# Patient Record
Sex: FEMALE | Race: BLACK OR AFRICAN AMERICAN | HISPANIC OR LATINO | Employment: OTHER | URBAN - METROPOLITAN AREA
[De-identification: names, ages, dates, MRNs, and addresses within clinical notes are randomized per-mention and may not be internally consistent; named-entity substitution may affect disease eponyms.]

---

## 2020-01-12 ENCOUNTER — OFFICE VISIT (OUTPATIENT)
Dept: URGENT CARE | Age: 60
End: 2020-01-12

## 2020-01-12 VITALS
HEART RATE: 60 BPM | OXYGEN SATURATION: 99 % | SYSTOLIC BLOOD PRESSURE: 53 MMHG | DIASTOLIC BLOOD PRESSURE: 27 MMHG | RESPIRATION RATE: 8 BRPM

## 2020-01-12 DIAGNOSIS — S09.90XA TRAUMATIC INJURY OF HEAD, INITIAL ENCOUNTER: Primary | ICD-10-CM

## 2020-01-12 NOTE — PROGRESS NOTES
[facility]        NAME: Debi Benavides is a 61 y o  female  : 1960    MRN: [id number]  DATE: 2020  TIME: 3:53 PM    Assessment and Plan   Traumatic injury of head, initial encounter [S09 90XA]  1  Traumatic injury of head, initial encounter  Transfer to other facility     Patient Instructions     Patient to [place] [address] ER via ambulance    Chief Complaint     Chief Complaint   Patient presents with    Head Injury     History of Present Illness     63yo F presents 1 hour after fall down unknown number of steps in her home  Per patient's daughter, patient was walking down steps when she fell down the steps  Daughter rushed to help the patient, and found her awake but disoriented at the bottom of the steps  Patient has laceration to right eyebrow with significant bruising surrounding right orbit  Patient's daughter states she had great difficulty getting patient come to  to be evaluated, but when patient consented she began to fill sickly on car ride to   Patient became clammy, pale, and lightheaded  When she arrived in , she lost consciousness at registration window  Pt was unconscious for approx 30s  Pt woke up when I sternal rubbed her  She remained verbally unresponsive for approx 5 minutes  Limited history obtained due to patients syncopal episode  Review of Systems   Review of Systems   Constitutional: Positive for activity change and diaphoresis  Negative for appetite change, fatigue and fever  Respiratory: Negative for apnea  Cardiovascular: Negative for chest pain and palpitations  Skin: Positive for wound  Neurological: Positive for syncope, speech difficulty and headaches  Negative for dizziness, tremors, seizures, facial asymmetry, weakness and numbness  Current Medications     No current outpatient medications on file      Current Allergies     Allergies as of 2020    (Not on File)          The following portions of the patient's history were reviewed and updated as appropriate: allergies, current medications, past family history, past medical history, past social history, past surgical history and problem list      No past medical history on file  No past surgical history on file  No family history on file  Medications have been verified  Objective   BP (!) 53/27 Comment: automatic dynamap  Pulse 60   Resp (!) 8   SpO2 99%   BS: 86     Physical Exam     Physical Exam   Constitutional: She appears well-developed and well-nourished  Cardiovascular: Regular rhythm and normal heart sounds  Bradycardia present  Exam reveals no gallop and no friction rub  No murmur heard  Pulmonary/Chest: Effort normal and breath sounds normal  No stridor  Bradypnea noted  She has no decreased breath sounds  She has no wheezes  She has no rhonchi  She has no rales  Abdominal: Soft  Bowel sounds are normal  She exhibits no distension  There is no tenderness  Neurological: She is disoriented  She displays normal reflexes  No cranial nerve deficit or sensory deficit  GCS eye subscore is 4  GCS verbal subscore is 5  GCS motor subscore is 5     Skin:

## 2020-01-19 ENCOUNTER — OFFICE VISIT (OUTPATIENT)
Dept: URGENT CARE | Age: 60
End: 2020-01-19
Payer: COMMERCIAL

## 2020-01-19 VITALS
HEART RATE: 77 BPM | BODY MASS INDEX: 35.73 KG/M2 | HEIGHT: 60 IN | DIASTOLIC BLOOD PRESSURE: 56 MMHG | WEIGHT: 182 LBS | SYSTOLIC BLOOD PRESSURE: 117 MMHG | TEMPERATURE: 97.3 F | RESPIRATION RATE: 16 BRPM | OXYGEN SATURATION: 96 %

## 2020-01-19 DIAGNOSIS — Z48.02 ENCOUNTER FOR REMOVAL OF SUTURES: Primary | ICD-10-CM

## 2020-01-19 PROCEDURE — 99211 OFF/OP EST MAY X REQ PHY/QHP: CPT | Performed by: PHYSICIAN ASSISTANT

## 2020-01-20 NOTE — PROGRESS NOTES
[facility]        NAME: Joyce Castrejon is a 61 y o  female  : 1960    MRN: [id number]  DATE: 2020  TIME: 7:41 PM    /56   Pulse 77   Temp (!) 97 3 °F (36 3 °C)   Resp 16   Ht 5' (1 524 m)   Wt 82 6 kg (182 lb)   SpO2 96%   BMI 35 54 kg/m²     Assessment and Plan   No primary diagnosis found  No diagnosis found  Patient Instructions       Follow up with PCP in 3-5 days  Proceed to  ER if symptoms worsen  Chief Complaint     Chief Complaint   Patient presents with    Suture / Staple Removal     Right eye area         History of Present Illness       Pt for suture removal     Suture / Staple Removal   The sutures were placed 7 to 10 days ago  She tried nothing since the wound repair  The treatment provided no relief  There has been no drainage from the wound  There is no redness present  There is no swelling present  There is no pain present  She has no difficulty moving the affected extremity or digit  Review of Systems   Review of Systems   Constitutional: Negative  HENT: Negative  Eyes: Negative  Respiratory: Negative  Cardiovascular: Negative  Gastrointestinal: Negative  Endocrine: Negative  Genitourinary: Negative  Musculoskeletal: Negative  Skin: Negative  Allergic/Immunologic: Negative  Neurological: Negative  Hematological: Negative  Psychiatric/Behavioral: Negative  All other systems reviewed and are negative  Current Medications     No current outpatient medications on file  Current Allergies     Allergies as of 2020    (No Known Allergies)            The following portions of the patient's history were reviewed and updated as appropriate: allergies, current medications, past family history, past medical history, past social history, past surgical history and problem list      History reviewed  No pertinent past medical history  History reviewed  No pertinent surgical history      History reviewed  No pertinent family history  Medications have been verified  Objective   /56   Pulse 77   Temp (!) 97 3 °F (36 3 °C)   Resp 16   Ht 5' (1 524 m)   Wt 82 6 kg (182 lb)   SpO2 96%   BMI 35 54 kg/m²        Physical Exam     Physical Exam   Constitutional: She is oriented to person, place, and time  She appears well-developed and well-nourished  HENT:   Head: Normocephalic and atraumatic  Right Ear: External ear normal    Left Ear: External ear normal    Nose: Nose normal    Mouth/Throat: Oropharynx is clear and moist    Eyes: Pupils are equal, round, and reactive to light  Conjunctivae and EOM are normal    Neck: Normal range of motion  Neck supple  Cardiovascular: Normal rate, regular rhythm, normal heart sounds and intact distal pulses  Pulmonary/Chest: Effort normal and breath sounds normal    Abdominal: Soft  Bowel sounds are normal    Musculoskeletal: Normal range of motion  Neurological: She is alert and oriented to person, place, and time  Skin: Skin is warm  Capillary refill takes less than 2 seconds  Right eyebrow 2 sutures removed    Psychiatric: She has a normal mood and affect  Her behavior is normal    Nursing note and vitals reviewed

## 2020-01-20 NOTE — PATIENT INSTRUCTIONS
Stitches Removal   WHAT YOU NEED TO KNOW:   Stitches may need to be removed in 3 to 14 days depending on the location of your wound  Your healthcare provider will use sterile forceps or tweezers to  the knot of each stitch  He will cut the stitch with scissors and pull the stitch out  You may feel a slight tug as the stitch comes out  He may place small steristrips across your wound after the stitches have been removed  These pieces of tape will peel and fall of on their own  Do not pull them off  DISCHARGE INSTRUCTIONS:   Return to the emergency department if:   · Your wound splits open  · You suddenly cannot move your injured joint  · You have sudden numbness around your wound  · You see red streaks coming from your wound  Contact your healthcare provider if:   · You have a fever and chills  · Your wound is red, warm, swollen, or leaking pus  · There is a bad smell coming from your wound  · You have increased pain in the wound area  · You have questions or concerns about your condition or care  Care for your wound:   · Clean your wound as directed  Carefully wash your wound with soap and water  Pat the area dry with a clean towel  · Protect your wound  Your wound can swell, bleed, or split open if it is stretched or bumped  You may need to wear a bandage that supports your wound until it is completely healed  · Minimize your scar  Use sunblock if your wound is exposed to the sun  Apply it every day after the stitches are removed  This will help prevent skin discoloration  Follow up with your healthcare provider as directed: You may need to return in 3 to 5 days if the stitches are on your face  Stitches on your scalp need to be removed after 7 to 14 days  Stitches over joints may remain in place up to 14 days  Write down your questions so you remember to ask them during your visits     © 2017 [employer] Information is for End User's use only and may not be sold, redistributed or otherwise used for commercial purposes  All illustrations and images included in CareNotes® are the copyrighted property of [employer]  or Maxi Amador  The above information is an  only  It is not intended as medical advice for individual conditions or treatments  Talk to your doctor, nurse or pharmacist before following any medical regimen to see if it is safe and effective for you

## 2020-05-30 ENCOUNTER — HOSPITAL ENCOUNTER (EMERGENCY)
Facility: HOSPITAL | Age: 60
Discharge: HOME | End: 2020-05-30
Attending: EMERGENCY MEDICINE
Payer: COMMERCIAL

## 2020-05-30 ENCOUNTER — OFFICE VISIT (OUTPATIENT)
Dept: URGENT CARE | Age: 60
End: 2020-05-30
Payer: COMMERCIAL

## 2020-05-30 ENCOUNTER — APPOINTMENT (EMERGENCY)
Dept: RADIOLOGY | Facility: HOSPITAL | Age: 60
End: 2020-05-30
Payer: COMMERCIAL

## 2020-05-30 VITALS
RESPIRATION RATE: 16 BRPM | DIASTOLIC BLOOD PRESSURE: 59 MMHG | OXYGEN SATURATION: 96 % | SYSTOLIC BLOOD PRESSURE: 125 MMHG | TEMPERATURE: 98.9 F | HEART RATE: 64 BPM

## 2020-05-30 VITALS
SYSTOLIC BLOOD PRESSURE: 136 MMHG | DIASTOLIC BLOOD PRESSURE: 64 MMHG | OXYGEN SATURATION: 98 % | RESPIRATION RATE: 18 BRPM | HEART RATE: 77 BPM | TEMPERATURE: 98.2 F

## 2020-05-30 DIAGNOSIS — R07.89 ATYPICAL CHEST PAIN: Primary | ICD-10-CM

## 2020-05-30 DIAGNOSIS — R07.9 CHEST PAIN, UNSPECIFIED TYPE: Primary | ICD-10-CM

## 2020-05-30 LAB
ALBUMIN SERPL BCP-MCNC: 4.1 G/DL (ref 3.5–5)
ALP SERPL-CCNC: 82 U/L (ref 46–116)
ALT SERPL W P-5'-P-CCNC: 20 U/L (ref 12–78)
ANION GAP SERPL CALCULATED.3IONS-SCNC: 6 MMOL/L (ref 4–13)
AST SERPL W P-5'-P-CCNC: 15 U/L (ref 5–45)
ATRIAL RATE: 69 BPM
ATRIAL RATE: 70 BPM
BASOPHILS # BLD AUTO: 0.03 THOUSANDS/ΜL (ref 0–0.1)
BASOPHILS NFR BLD AUTO: 0 % (ref 0–1)
BILIRUB SERPL-MCNC: 0.39 MG/DL (ref 0.2–1)
BUN SERPL-MCNC: 15 MG/DL (ref 5–25)
CALCIUM SERPL-MCNC: 10 MG/DL (ref 8.3–10.1)
CHLORIDE SERPL-SCNC: 102 MMOL/L (ref 100–108)
CO2 SERPL-SCNC: 32 MMOL/L (ref 21–32)
CREAT SERPL-MCNC: 0.74 MG/DL (ref 0.6–1.3)
D DIMER PPP FEU-MCNC: 0.27 UG/ML FEU
EOSINOPHIL # BLD AUTO: 0.06 THOUSAND/ΜL (ref 0–0.61)
EOSINOPHIL NFR BLD AUTO: 1 % (ref 0–6)
ERYTHROCYTE [DISTWIDTH] IN BLOOD BY AUTOMATED COUNT: 15.2 % (ref 11.6–15.1)
GFR SERPL CREATININE-BSD FRML MDRD: 102 ML/MIN/1.73SQ M
GLUCOSE SERPL-MCNC: 128 MG/DL (ref 65–140)
HCT VFR BLD AUTO: 45 % (ref 34.8–46.1)
HGB BLD-MCNC: 14.2 G/DL (ref 11.5–15.4)
IMM GRANULOCYTES # BLD AUTO: 0.03 THOUSAND/UL (ref 0–0.2)
IMM GRANULOCYTES NFR BLD AUTO: 0 % (ref 0–2)
LYMPHOCYTES # BLD AUTO: 1.33 THOUSANDS/ΜL (ref 0.6–4.47)
LYMPHOCYTES NFR BLD AUTO: 14 % (ref 14–44)
MCH RBC QN AUTO: 30.3 PG (ref 26.8–34.3)
MCHC RBC AUTO-ENTMCNC: 31.6 G/DL (ref 31.4–37.4)
MCV RBC AUTO: 96 FL (ref 82–98)
MONOCYTES # BLD AUTO: 0.39 THOUSAND/ΜL (ref 0.17–1.22)
MONOCYTES NFR BLD AUTO: 4 % (ref 4–12)
NEUTROPHILS # BLD AUTO: 7.77 THOUSANDS/ΜL (ref 1.85–7.62)
NEUTS SEG NFR BLD AUTO: 81 % (ref 43–75)
NRBC BLD AUTO-RTO: 0 /100 WBCS
P AXIS: 61 DEGREES
P AXIS: 69 DEGREES
PLATELET # BLD AUTO: 264 THOUSANDS/UL (ref 149–390)
PMV BLD AUTO: 10.3 FL (ref 8.9–12.7)
POTASSIUM SERPL-SCNC: 4.3 MMOL/L (ref 3.5–5.3)
PR INTERVAL: 154 MS
PR INTERVAL: 158 MS
PROT SERPL-MCNC: 7.7 G/DL (ref 6.4–8.2)
QRS AXIS: 32 DEGREES
QRS AXIS: 34 DEGREES
QRSD INTERVAL: 76 MS
QRSD INTERVAL: 78 MS
QT INTERVAL: 386 MS
QT INTERVAL: 390 MS
QTC INTERVAL: 416 MS
QTC INTERVAL: 417 MS
RBC # BLD AUTO: 4.68 MILLION/UL (ref 3.81–5.12)
SODIUM SERPL-SCNC: 140 MMOL/L (ref 136–145)
T WAVE AXIS: 43 DEGREES
T WAVE AXIS: 47 DEGREES
TROPONIN I SERPL-MCNC: <0.02 NG/ML
VENTRICULAR RATE: 69 BPM
VENTRICULAR RATE: 70 BPM
WBC # BLD AUTO: 9.61 THOUSAND/UL (ref 4.31–10.16)

## 2020-05-30 PROCEDURE — 36415 COLL VENOUS BLD VENIPUNCTURE: CPT | Performed by: EMERGENCY MEDICINE

## 2020-05-30 PROCEDURE — 96374 THER/PROPH/DIAG INJ IV PUSH: CPT

## 2020-05-30 PROCEDURE — 99284 EMERGENCY DEPT VISIT MOD MDM: CPT | Performed by: EMERGENCY MEDICINE

## 2020-05-30 PROCEDURE — 84484 ASSAY OF TROPONIN QUANT: CPT | Performed by: EMERGENCY MEDICINE

## 2020-05-30 PROCEDURE — 99213 OFFICE O/P EST LOW 20 MIN: CPT | Performed by: PHYSICIAN ASSISTANT

## 2020-05-30 PROCEDURE — 85025 COMPLETE CBC W/AUTO DIFF WBC: CPT | Performed by: EMERGENCY MEDICINE

## 2020-05-30 PROCEDURE — 85379 FIBRIN DEGRADATION QUANT: CPT | Performed by: EMERGENCY MEDICINE

## 2020-05-30 PROCEDURE — 71046 X-RAY EXAM CHEST 2 VIEWS: CPT

## 2020-05-30 PROCEDURE — 99285 EMERGENCY DEPT VISIT HI MDM: CPT

## 2020-05-30 PROCEDURE — 80053 COMPREHEN METABOLIC PANEL: CPT | Performed by: EMERGENCY MEDICINE

## 2020-05-30 PROCEDURE — 93010 ELECTROCARDIOGRAM REPORT: CPT | Performed by: PHYSICIAN ASSISTANT

## 2020-05-30 PROCEDURE — 93005 ELECTROCARDIOGRAM TRACING: CPT | Performed by: PHYSICIAN ASSISTANT

## 2020-05-30 RX ORDER — IBUPROFEN 600 MG/1
600 TABLET ORAL EVERY 8 HOURS PRN
Qty: 30 TABLET | Refills: 0 | Status: SHIPPED | OUTPATIENT
Start: 2020-05-30 | End: 2021-11-01

## 2020-05-30 RX ORDER — KETOROLAC TROMETHAMINE 30 MG/ML
15 INJECTION, SOLUTION INTRAMUSCULAR; INTRAVENOUS ONCE
Status: COMPLETED | OUTPATIENT
Start: 2020-05-30 | End: 2020-05-30

## 2020-05-30 RX ORDER — OMEGA-3-ACID ETHYL ESTERS 1 G/1
2 CAPSULE, LIQUID FILLED ORAL 2 TIMES DAILY
COMMUNITY
End: 2021-11-01

## 2020-05-30 RX ADMIN — KETOROLAC TROMETHAMINE 15 MG: 30 INJECTION, SOLUTION INTRAMUSCULAR at 14:36

## 2020-05-31 LAB
ATRIAL RATE: 72 BPM
P AXIS: 76 DEGREES
PR INTERVAL: 158 MS
QRS AXIS: 20 DEGREES
QRSD INTERVAL: 74 MS
QT INTERVAL: 368 MS
QTC INTERVAL: 402 MS
T WAVE AXIS: 77 DEGREES
VENTRICULAR RATE: 72 BPM

## 2020-05-31 PROCEDURE — 93010 ELECTROCARDIOGRAM REPORT: CPT | Performed by: INTERNAL MEDICINE

## 2021-04-10 ENCOUNTER — OFFICE VISIT (OUTPATIENT)
Dept: URGENT CARE | Age: 61
End: 2021-04-10
Payer: COMMERCIAL

## 2021-04-10 VITALS
TEMPERATURE: 97.6 F | RESPIRATION RATE: 16 BRPM | SYSTOLIC BLOOD PRESSURE: 149 MMHG | HEART RATE: 76 BPM | OXYGEN SATURATION: 96 % | DIASTOLIC BLOOD PRESSURE: 72 MMHG

## 2021-04-10 DIAGNOSIS — R53.83 FATIGUE, UNSPECIFIED TYPE: Primary | ICD-10-CM

## 2021-04-10 LAB
ATRIAL RATE: 67 BPM
ATRIAL RATE: 67 BPM
P AXIS: 68 DEGREES
P AXIS: 68 DEGREES
PR INTERVAL: 170 MS
PR INTERVAL: 170 MS
QRS AXIS: 8 DEGREES
QRS AXIS: 8 DEGREES
QRSD INTERVAL: 82 MS
QRSD INTERVAL: 82 MS
QT INTERVAL: 386 MS
QT INTERVAL: 386 MS
QTC INTERVAL: 407 MS
QTC INTERVAL: 407 MS
T WAVE AXIS: 43 DEGREES
T WAVE AXIS: 43 DEGREES
VENTRICULAR RATE: 67 BPM
VENTRICULAR RATE: 67 BPM

## 2021-04-10 PROCEDURE — 93005 ELECTROCARDIOGRAM TRACING: CPT | Performed by: PHYSICIAN ASSISTANT

## 2021-04-10 PROCEDURE — 99213 OFFICE O/P EST LOW 20 MIN: CPT | Performed by: PHYSICIAN ASSISTANT

## 2021-04-10 PROCEDURE — 93010 ELECTROCARDIOGRAM REPORT: CPT | Performed by: PHYSICIAN ASSISTANT

## 2021-04-10 NOTE — PROGRESS NOTES
[facility]        NAME: Joseph Parr is a 61 y o  female  : 1960    MRN: [id number]  DATE: April 10, 2021  TIME: 3:36 PM    Assessment and Plan   Fatigue, unspecified type [R53 83]  1  Fatigue, unspecified type     Discussed with patient and granddaughter that fatigue can be a presenting symptom for cardiac disease in women particularly over the age of 54  Recommed ECG for further evaluation  Discussed ECG result normal  Fatigue can have many causes from acute illness to thyroid issues to normal aging process  Recommend follow-up with PCP on Monday  As patient does appear pale recommend Vitamin C and Vitamin D3 in the interim  If symptoms worsen, report to the emergency department  Patient Instructions   Discussed with patient and granddaughter who is translating that fatigue can have multiple causes, Does not appear to be a cardiac issue today  Recommend 2000 IU of Vitamin D3 and 1,000 mg of Vitamin C daily  Follow up with PCP in 3-5 days  Proceed to  ER if symptoms worsen  Chief Complaint     Chief Complaint   Patient presents with    Fatigue     Pt report weakness since waking up  Denies fever or coldlike symptoms, denies CP/SOB or dizziness  History of Present Illness       61year old female presents with complaints of fatigue  Pt reports that she woke up feeling much more tired than usual  She has never had these symptoms before  Pt denies lightheadedness, but reports an episode of feeling dizzy than lasted a few seconds, but never returned  She denies fever, chills, runny nose, sore throat, headache, myalgias, loss of taste, loss of smell, N/V/D, SOB, CP, and chest discomfort  No history of stroke or other cardiovascular disease  No other concerns or complaints today  Pt is [place] speaking only and her granddaughter is her with her translating today  Interpretor services offered and declined        Review of Systems   Review of Systems   Constitutional: Positive for fatigue  Negative for chills, diaphoresis and fever  HENT: Negative for congestion, postnasal drip, rhinorrhea and sore throat  Respiratory: Negative for chest tightness and shortness of breath  Cardiovascular: Negative for chest pain and palpitations  Gastrointestinal: Positive for vomiting  Negative for diarrhea and nausea  Neurological: Positive for dizziness  Negative for tremors, syncope, weakness, light-headedness, numbness and headaches  Current Medications       Current Outpatient Medications:     ibuprofen (MOTRIN) 600 mg tablet, Take 1 tablet (600 mg total) by mouth every 8 (eight) hours as needed for moderate pain, Disp: 30 tablet, Rfl: 0    omega-3-acid ethyl esters (LOVAZA) 1 g capsule, Take 2 g by mouth 2 (two) times a day, Disp: , Rfl:     Current Allergies     Allergies as of 04/10/2021    (No Known Allergies)            The following portions of the patient's history were reviewed and updated as appropriate: allergies, current medications, past family history, past medical history, past social history, past surgical history and problem list      Past Medical History:   Diagnosis Date    Disease of thyroid gland     Thyroid nodule    Falls 2019    Syncope        Past Surgical History:   Procedure Laterality Date     SECTION         No family history on file  Medications have been verified  Objective   /72   Pulse 76   Temp 97 6 °F (36 4 °C)   Resp 16   SpO2 96%   No LMP recorded  Physical Exam     Physical Exam  Vitals signs and nursing note reviewed  Constitutional:       General: She is awake  Appearance: Normal appearance  She is well-developed and well-groomed  HENT:      Head: Normocephalic and atraumatic  Right Ear: Hearing and external ear normal       Left Ear: Hearing and external ear normal    Eyes:      General: Lids are normal  Vision grossly intact  Gaze aligned appropriately        Extraocular Movements: Extraocular movements intact  Conjunctiva/sclera: Conjunctivae normal    Neck:      Musculoskeletal: Normal range of motion  Vascular: Normal carotid pulses  No carotid bruit  Cardiovascular:      Rate and Rhythm: Normal rate and regular rhythm  Heart sounds: Normal heart sounds, S1 normal and S2 normal  Heart sounds not distant  No murmur  No friction rub  No gallop  Pulmonary:      Effort: Pulmonary effort is normal       Breath sounds: Normal breath sounds  No decreased breath sounds, wheezing, rhonchi or rales  Lymphadenopathy:      Cervical: No cervical adenopathy  Skin:     General: Skin is warm  Coloration: Skin is pale  Neurological:      Mental Status: She is alert and oriented to person, place, and time  Coordination: Coordination is intact  Gait: Gait abnormal (antalgic )  Psychiatric:         Attention and Perception: Attention and perception normal          Mood and Affect: Mood and affect normal          Speech: Speech normal          Behavior: Behavior normal  Behavior is cooperative  ECG read by myself in office today demonstrates NSR with no ischemic changes

## 2021-04-10 NOTE — PATIENT INSTRUCTIONS
Discussed with patient and granddaughter who is translating that fatigue can have multiple causes, Does not appear to be a cardiac issue today  Recommend 2000 IU of Vitamin D3 and 1,000 mg of Vitamin C daily  Follow-up with PCP on Monday  If symptoms worsen report to the emergency room

## 2021-11-01 ENCOUNTER — OFFICE VISIT (OUTPATIENT)
Dept: URGENT CARE | Age: 61
End: 2021-11-01
Payer: COMMERCIAL

## 2021-11-01 VITALS
OXYGEN SATURATION: 98 % | TEMPERATURE: 96.2 F | SYSTOLIC BLOOD PRESSURE: 149 MMHG | RESPIRATION RATE: 20 BRPM | DIASTOLIC BLOOD PRESSURE: 70 MMHG | BODY MASS INDEX: 34.76 KG/M2 | WEIGHT: 178 LBS | HEART RATE: 75 BPM

## 2021-11-01 DIAGNOSIS — R07.9 CHEST PAIN, UNSPECIFIED TYPE: Primary | ICD-10-CM

## 2021-11-01 LAB
ATRIAL RATE: 73 BPM
P AXIS: 55 DEGREES
PR INTERVAL: 154 MS
QRS AXIS: 7 DEGREES
QRSD INTERVAL: 78 MS
QT INTERVAL: 380 MS
QTC INTERVAL: 418 MS
T WAVE AXIS: 33 DEGREES
VENTRICULAR RATE: 73 BPM

## 2021-11-01 PROCEDURE — 93005 ELECTROCARDIOGRAM TRACING: CPT | Performed by: PHYSICIAN ASSISTANT

## 2021-11-01 PROCEDURE — 93010 ELECTROCARDIOGRAM REPORT: CPT | Performed by: INTERNAL MEDICINE

## 2021-11-01 PROCEDURE — 99213 OFFICE O/P EST LOW 20 MIN: CPT | Performed by: PHYSICIAN ASSISTANT

## 2021-11-01 RX ORDER — OMEPRAZOLE 20 MG/1
20 CAPSULE, DELAYED RELEASE ORAL DAILY
COMMUNITY
Start: 2021-06-14